# Patient Record
Sex: FEMALE | Race: WHITE | NOT HISPANIC OR LATINO | Employment: OTHER | URBAN - METROPOLITAN AREA
[De-identification: names, ages, dates, MRNs, and addresses within clinical notes are randomized per-mention and may not be internally consistent; named-entity substitution may affect disease eponyms.]

---

## 2019-03-22 ENCOUNTER — TRANSCRIBE ORDERS (OUTPATIENT)
Dept: ADMINISTRATIVE | Facility: HOSPITAL | Age: 69
End: 2019-03-22

## 2019-03-22 DIAGNOSIS — R07.9 CHEST PAIN IN ADULT: Primary | ICD-10-CM

## 2019-04-11 ENCOUNTER — HOSPITAL ENCOUNTER (OUTPATIENT)
Dept: CARDIOLOGY | Facility: HOSPITAL | Age: 69
Discharge: HOME | End: 2019-04-11
Admitting: INTERNAL MEDICINE

## 2019-04-11 VITALS
WEIGHT: 147 LBS | BODY MASS INDEX: 23.63 KG/M2 | HEIGHT: 66 IN | HEART RATE: 70 BPM | DIASTOLIC BLOOD PRESSURE: 86 MMHG | SYSTOLIC BLOOD PRESSURE: 144 MMHG

## 2019-04-11 DIAGNOSIS — R07.9 CHEST PAIN IN ADULT: ICD-10-CM

## 2019-04-11 LAB
BH CV ECHO MEAS - AO MAX PG: 8.8 MMHG
BH CV ECHO MEAS - AO ROOT AREA (BSA CORRECTED): 1.4
BH CV ECHO MEAS - AO ROOT AREA: 4.5 CM^2
BH CV ECHO MEAS - AO ROOT DIAM: 2.4 CM
BH CV ECHO MEAS - AO V2 MAX: 148 CM/SEC
BH CV ECHO MEAS - BSA(HAYCOCK): 1.8 M^2
BH CV ECHO MEAS - BSA: 1.8 M^2
BH CV ECHO MEAS - BZI_BMI: 23.7 KILOGRAMS/M^2
BH CV ECHO MEAS - BZI_METRIC_HEIGHT: 167.6 CM
BH CV ECHO MEAS - BZI_METRIC_WEIGHT: 66.7 KG
BH CV ECHO MEAS - EDV(CUBED): 74.6 ML
BH CV ECHO MEAS - EDV(MOD-SP2): 56 ML
BH CV ECHO MEAS - EDV(MOD-SP4): 60 ML
BH CV ECHO MEAS - EDV(TEICH): 79 ML
BH CV ECHO MEAS - EF(CUBED): 68 %
BH CV ECHO MEAS - EF(MOD-BP): 66 %
BH CV ECHO MEAS - EF(MOD-SP2): 66.1 %
BH CV ECHO MEAS - EF(MOD-SP4): 66.7 %
BH CV ECHO MEAS - EF(TEICH): 59.9 %
BH CV ECHO MEAS - ESV(CUBED): 23.9 ML
BH CV ECHO MEAS - ESV(MOD-SP2): 19 ML
BH CV ECHO MEAS - ESV(MOD-SP4): 20 ML
BH CV ECHO MEAS - ESV(TEICH): 31.7 ML
BH CV ECHO MEAS - FS: 31.6 %
BH CV ECHO MEAS - IVS/LVPW: 0.98
BH CV ECHO MEAS - IVSD: 0.79 CM
BH CV ECHO MEAS - LA DIMENSION: 3.1 CM
BH CV ECHO MEAS - LA/AO: 1.3
BH CV ECHO MEAS - LV DIASTOLIC VOL/BSA (35-75): 34.2 ML/M^2
BH CV ECHO MEAS - LV MASS(C)D: 101.3 GRAMS
BH CV ECHO MEAS - LV MASS(C)DI: 57.7 GRAMS/M^2
BH CV ECHO MEAS - LV SYSTOLIC VOL/BSA (12-30): 11.4 ML/M^2
BH CV ECHO MEAS - LVIDD: 4.2 CM
BH CV ECHO MEAS - LVIDS: 2.9 CM
BH CV ECHO MEAS - LVLD AP2: 6.7 CM
BH CV ECHO MEAS - LVLD AP4: 7 CM
BH CV ECHO MEAS - LVLS AP2: 5.8 CM
BH CV ECHO MEAS - LVLS AP4: 5.6 CM
BH CV ECHO MEAS - LVOT AREA (M): 3.1 CM^2
BH CV ECHO MEAS - LVOT AREA: 3.1 CM^2
BH CV ECHO MEAS - LVOT DIAM: 2 CM
BH CV ECHO MEAS - LVPWD: 0.81 CM
BH CV ECHO MEAS - SI(CUBED): 28.9 ML/M^2
BH CV ECHO MEAS - SI(MOD-SP2): 21.1 ML/M^2
BH CV ECHO MEAS - SI(MOD-SP4): 22.8 ML/M^2
BH CV ECHO MEAS - SI(TEICH): 27 ML/M^2
BH CV ECHO MEAS - SV(CUBED): 50.7 ML
BH CV ECHO MEAS - SV(MOD-SP2): 37 ML
BH CV ECHO MEAS - SV(MOD-SP4): 40 ML
BH CV ECHO MEAS - SV(TEICH): 47.4 ML
BH CV STRESS BP STAGE 1: NORMAL
BH CV STRESS BP STAGE 2: NORMAL
BH CV STRESS BP STAGE 3: NORMAL
BH CV STRESS DURATION MIN STAGE 1: 3
BH CV STRESS DURATION MIN STAGE 2: 3
BH CV STRESS DURATION MIN STAGE 3: 2
BH CV STRESS DURATION SEC STAGE 1: 0
BH CV STRESS DURATION SEC STAGE 2: 0
BH CV STRESS DURATION SEC STAGE 3: 22
BH CV STRESS ECHO POST STRESS EJECTION FRACTION EF: 75 %
BH CV STRESS GRADE STAGE 1: 10
BH CV STRESS GRADE STAGE 2: 12
BH CV STRESS GRADE STAGE 3: 14
BH CV STRESS HR STAGE 1: 96
BH CV STRESS HR STAGE 2: 130
BH CV STRESS HR STAGE 3: 153
BH CV STRESS METS STAGE 1: 5
BH CV STRESS METS STAGE 2: 7.5
BH CV STRESS METS STAGE 3: 10
BH CV STRESS O2 STAGE 1: 100
BH CV STRESS O2 STAGE 2: 95
BH CV STRESS O2 STAGE 3: 94
BH CV STRESS PROTOCOL 1: NORMAL
BH CV STRESS RECOVERY BP: NORMAL MMHG
BH CV STRESS RECOVERY HR: 68 BPM
BH CV STRESS SPEED STAGE 1: 1.7
BH CV STRESS SPEED STAGE 2: 2.5
BH CV STRESS SPEED STAGE 3: 3.4
BH CV STRESS STAGE 1: 1
BH CV STRESS STAGE 2: 2
BH CV STRESS STAGE 3: 3
BH CV VAS BP RIGHT ARM: NORMAL MMHG
LV EF 2D ECHO EST: 65 %
MAXIMAL PREDICTED HEART RATE: 152 BPM
PERCENT MAX PREDICTED HR: 101.97 %
STRESS BASELINE BP: NORMAL MMHG
STRESS BASELINE HR: 70 BPM
STRESS O2 SAT REST: 100 %
STRESS PERCENT HR: 120 %
STRESS POST ESTIMATED WORKLOAD: 10.1 METS
STRESS POST EXERCISE DUR MIN: 8 MIN
STRESS POST EXERCISE DUR SEC: 22 SEC
STRESS POST O2 SAT PEAK: 94 %
STRESS POST PEAK BP: NORMAL MMHG
STRESS POST PEAK HR: 155 BPM
STRESS TARGET HR: 129 BPM

## 2019-04-11 PROCEDURE — 93018 CV STRESS TEST I&R ONLY: CPT | Performed by: INTERNAL MEDICINE

## 2019-04-11 PROCEDURE — 93350 STRESS TTE ONLY: CPT | Performed by: INTERNAL MEDICINE

## 2019-04-11 PROCEDURE — 93017 CV STRESS TEST TRACING ONLY: CPT

## 2019-04-11 PROCEDURE — 93350 STRESS TTE ONLY: CPT

## 2019-04-11 RX ORDER — ZOLPIDEM TARTRATE 10 MG/1
TABLET ORAL AS NEEDED
COMMUNITY
Start: 2019-03-19

## 2019-04-11 RX ORDER — LEVOTHYROXINE SODIUM 88 UG/1
88 TABLET ORAL
COMMUNITY
Start: 2019-03-19

## 2021-02-16 ENCOUNTER — TRANSCRIBE ORDERS (OUTPATIENT)
Dept: ADMINISTRATIVE | Facility: HOSPITAL | Age: 71
End: 2021-02-16

## 2021-02-16 DIAGNOSIS — Z12.31 VISIT FOR SCREENING MAMMOGRAM: Primary | ICD-10-CM

## 2021-04-14 ENCOUNTER — HOSPITAL ENCOUNTER (OUTPATIENT)
Dept: MAMMOGRAPHY | Facility: HOSPITAL | Age: 71
Discharge: HOME | End: 2021-04-14
Admitting: INTERNAL MEDICINE

## 2021-04-14 ENCOUNTER — APPOINTMENT (OUTPATIENT)
Dept: OTHER | Facility: HOSPITAL | Age: 71
End: 2021-04-14

## 2021-04-14 DIAGNOSIS — Z92.89 H/O MAMMOGRAM: ICD-10-CM

## 2021-04-14 DIAGNOSIS — Z12.31 VISIT FOR SCREENING MAMMOGRAM: ICD-10-CM

## 2021-04-14 PROCEDURE — 77063 BREAST TOMOSYNTHESIS BI: CPT | Performed by: RADIOLOGY

## 2021-04-14 PROCEDURE — 77067 SCR MAMMO BI INCL CAD: CPT | Performed by: RADIOLOGY

## 2021-04-14 PROCEDURE — 77063 BREAST TOMOSYNTHESIS BI: CPT

## 2021-04-14 PROCEDURE — 77067 SCR MAMMO BI INCL CAD: CPT

## 2021-07-20 ENCOUNTER — TRANSCRIBE ORDERS (OUTPATIENT)
Dept: MAMMOGRAPHY | Facility: HOSPITAL | Age: 71
End: 2021-07-20

## 2021-07-20 DIAGNOSIS — N63.10 LUMP OF RIGHT BREAST: Primary | ICD-10-CM

## 2021-07-20 DIAGNOSIS — N63.10 UNSPECIFIED LUMP IN THE RIGHT BREAST, UNSPECIFIED QUADRANT: ICD-10-CM

## 2021-07-22 ENCOUNTER — TRANSCRIBE ORDERS (OUTPATIENT)
Dept: ADMINISTRATIVE | Facility: HOSPITAL | Age: 71
End: 2021-07-22

## 2021-07-22 DIAGNOSIS — N64.4 BREAST PAIN, RIGHT: Primary | ICD-10-CM

## 2021-08-25 ENCOUNTER — HOSPITAL ENCOUNTER (OUTPATIENT)
Dept: ULTRASOUND IMAGING | Facility: HOSPITAL | Age: 71
Discharge: HOME | End: 2021-08-25

## 2021-08-25 ENCOUNTER — HOSPITAL ENCOUNTER (OUTPATIENT)
Dept: MAMMOGRAPHY | Facility: HOSPITAL | Age: 71
Discharge: HOME | End: 2021-08-25

## 2021-08-25 DIAGNOSIS — N64.4 BREAST PAIN, RIGHT: ICD-10-CM

## 2021-08-25 PROCEDURE — 77065 DX MAMMO INCL CAD UNI: CPT

## 2021-08-25 PROCEDURE — 77065 DX MAMMO INCL CAD UNI: CPT | Performed by: RADIOLOGY

## 2021-08-25 PROCEDURE — G0279 TOMOSYNTHESIS, MAMMO: HCPCS

## 2021-08-25 PROCEDURE — G0279 TOMOSYNTHESIS, MAMMO: HCPCS | Performed by: RADIOLOGY

## 2021-08-25 PROCEDURE — 76642 ULTRASOUND BREAST LIMITED: CPT

## 2021-08-25 PROCEDURE — 76642 ULTRASOUND BREAST LIMITED: CPT | Performed by: RADIOLOGY

## 2021-11-05 ENCOUNTER — TRANSCRIBE ORDERS (OUTPATIENT)
Dept: ADMINISTRATIVE | Facility: HOSPITAL | Age: 71
End: 2021-11-05

## 2021-11-05 DIAGNOSIS — G89.29 CHRONIC NONINTRACTABLE HEADACHE, UNSPECIFIED HEADACHE TYPE: Primary | ICD-10-CM

## 2021-11-05 DIAGNOSIS — R51.9 CHRONIC NONINTRACTABLE HEADACHE, UNSPECIFIED HEADACHE TYPE: Primary | ICD-10-CM

## 2022-01-05 ENCOUNTER — OFFICE VISIT (OUTPATIENT)
Dept: NEUROSURGERY | Facility: CLINIC | Age: 72
End: 2022-01-05

## 2022-01-05 VITALS
WEIGHT: 172.8 LBS | HEIGHT: 66 IN | BODY MASS INDEX: 27.77 KG/M2 | SYSTOLIC BLOOD PRESSURE: 162 MMHG | DIASTOLIC BLOOD PRESSURE: 92 MMHG | TEMPERATURE: 97.5 F

## 2022-01-05 DIAGNOSIS — J38.02 VOCAL CORD PARALYSIS, BILATERAL PARTIAL: ICD-10-CM

## 2022-01-05 DIAGNOSIS — I10 ESSENTIAL HYPERTENSION: ICD-10-CM

## 2022-01-05 DIAGNOSIS — M54.2 NECK PAIN: Primary | ICD-10-CM

## 2022-01-05 PROBLEM — Z79.890 LONG-TERM CURRENT USE OF THYROID HORMONE REPLACEMENT THERAPY: Status: ACTIVE | Noted: 2022-01-05

## 2022-01-05 PROCEDURE — 99204 OFFICE O/P NEW MOD 45 MIN: CPT | Performed by: NEUROLOGICAL SURGERY

## 2022-01-05 RX ORDER — HYDROCHLOROTHIAZIDE 12.5 MG/1
12.5 TABLET ORAL DAILY
COMMUNITY
Start: 2021-12-31

## 2022-01-05 RX ORDER — LEVOTHYROXINE SODIUM 0.1 MG/1
TABLET ORAL
COMMUNITY
Start: 2021-12-09

## 2022-09-09 ENCOUNTER — OFFICE VISIT (OUTPATIENT)
Dept: CARDIOLOGY | Facility: CLINIC | Age: 72
End: 2022-09-09

## 2022-09-09 VITALS
WEIGHT: 170 LBS | OXYGEN SATURATION: 98 % | HEART RATE: 93 BPM | SYSTOLIC BLOOD PRESSURE: 150 MMHG | HEIGHT: 66 IN | BODY MASS INDEX: 27.32 KG/M2 | DIASTOLIC BLOOD PRESSURE: 80 MMHG

## 2022-09-09 DIAGNOSIS — R07.89 CHEST PAIN, ATYPICAL: Primary | ICD-10-CM

## 2022-09-09 PROCEDURE — 93000 ELECTROCARDIOGRAM COMPLETE: CPT | Performed by: INTERNAL MEDICINE

## 2022-09-09 PROCEDURE — 99203 OFFICE O/P NEW LOW 30 MIN: CPT | Performed by: INTERNAL MEDICINE

## 2022-09-09 RX ORDER — DIPHENOXYLATE HYDROCHLORIDE AND ATROPINE SULFATE 2.5; .025 MG/1; MG/1
TABLET ORAL DAILY
COMMUNITY

## 2022-09-09 RX ORDER — OMEPRAZOLE 40 MG/1
40 CAPSULE, DELAYED RELEASE ORAL DAILY
COMMUNITY
Start: 2022-08-26

## 2022-09-21 ENCOUNTER — HOSPITAL ENCOUNTER (OUTPATIENT)
Dept: CARDIOLOGY | Facility: HOSPITAL | Age: 72
Discharge: HOME | End: 2022-09-21
Admitting: INTERNAL MEDICINE

## 2022-09-21 DIAGNOSIS — R07.89 CHEST PAIN, ATYPICAL: ICD-10-CM

## 2022-09-21 LAB
BH CV ECHO MEAS - EDV(CUBED): 54.9 ML
BH CV ECHO MEAS - EDV(MOD-SP2): 124 ML
BH CV ECHO MEAS - EDV(MOD-SP4): 102 ML
BH CV ECHO MEAS - EF(MOD-BP): 51 %
BH CV ECHO MEAS - EF(MOD-SP2): 50.5 %
BH CV ECHO MEAS - EF(MOD-SP4): 51.7 %
BH CV ECHO MEAS - ESV(CUBED): 19.7 ML
BH CV ECHO MEAS - ESV(MOD-SP2): 61.4 ML
BH CV ECHO MEAS - ESV(MOD-SP4): 49.3 ML
BH CV ECHO MEAS - FS: 28.9 %
BH CV ECHO MEAS - IVS/LVPW: 0.92 CM
BH CV ECHO MEAS - IVSD: 1.1 CM
BH CV ECHO MEAS - LA DIMENSION: 3.4 CM
BH CV ECHO MEAS - LV DIASTOLIC VOL/BSA (35-75): 54.8 CM2
BH CV ECHO MEAS - LV MASS(C)D: 143.8 GRAMS
BH CV ECHO MEAS - LV SYSTOLIC VOL/BSA (12-30): 26.5 CM2
BH CV ECHO MEAS - LVIDD: 3.8 CM
BH CV ECHO MEAS - LVIDS: 2.7 CM
BH CV ECHO MEAS - LVPWD: 1.2 CM
BH CV ECHO MEAS - SI(MOD-SP2): 33.6 ML/M2
BH CV ECHO MEAS - SI(MOD-SP4): 28.3 ML/M2
BH CV ECHO MEAS - SV(MOD-SP2): 62.6 ML
BH CV ECHO MEAS - SV(MOD-SP4): 52.7 ML
BH CV STRESS BP STAGE 1: NORMAL
BH CV STRESS BP STAGE 2: NORMAL
BH CV STRESS DURATION MIN STAGE 1: 3
BH CV STRESS DURATION MIN STAGE 2: 3
BH CV STRESS DURATION SEC STAGE 1: 0
BH CV STRESS DURATION SEC STAGE 2: 7
BH CV STRESS ECHO POST STRESS EJECTION FRACTION EF: 60 %
BH CV STRESS GRADE STAGE 1: 10
BH CV STRESS GRADE STAGE 2: 12
BH CV STRESS HR STAGE 1: 139
BH CV STRESS HR STAGE 2: 160
BH CV STRESS METS STAGE 1: 5
BH CV STRESS METS STAGE 2: 7.5
BH CV STRESS O2 STAGE 1: 98
BH CV STRESS O2 STAGE 2: 98
BH CV STRESS PROTOCOL 1: NORMAL
BH CV STRESS RECOVERY BP: NORMAL MMHG
BH CV STRESS RECOVERY HR: 84 BPM
BH CV STRESS SPEED STAGE 1: 1.7
BH CV STRESS SPEED STAGE 2: 2.5
BH CV STRESS STAGE 1: 1
BH CV STRESS STAGE 2: 2
BH CV VAS BP LEFT ARM: NORMAL MMHG
MAXIMAL PREDICTED HEART RATE: 148 BPM
PERCENT MAX PREDICTED HR: 108.11 %
STRESS BASELINE BP: NORMAL MMHG
STRESS BASELINE HR: 73 BPM
STRESS O2 SAT REST: 99 %
STRESS PERCENT HR: 127 %
STRESS POST ESTIMATED WORKLOAD: 7 METS
STRESS POST EXERCISE DUR MIN: 6 MIN
STRESS POST EXERCISE DUR SEC: 7 SEC
STRESS POST O2 SAT PEAK: 97 %
STRESS POST PEAK BP: NORMAL MMHG
STRESS POST PEAK HR: 160 BPM
STRESS TARGET HR: 126 BPM

## 2022-09-21 PROCEDURE — 93350 STRESS TTE ONLY: CPT

## 2022-09-21 PROCEDURE — 93352 ADMIN ECG CONTRAST AGENT: CPT | Performed by: INTERNAL MEDICINE

## 2022-09-21 PROCEDURE — 93325 DOPPLER ECHO COLOR FLOW MAPG: CPT

## 2022-09-21 PROCEDURE — 25010000002 SULFUR HEXAFLUORIDE MICROSPH 60.7-25 MG RECONSTITUTED SUSPENSION: Performed by: INTERNAL MEDICINE

## 2022-09-21 PROCEDURE — 93017 CV STRESS TEST TRACING ONLY: CPT

## 2022-09-21 PROCEDURE — 93320 DOPPLER ECHO COMPLETE: CPT

## 2022-09-21 PROCEDURE — 93350 STRESS TTE ONLY: CPT | Performed by: INTERNAL MEDICINE

## 2022-09-21 PROCEDURE — 93018 CV STRESS TEST I&R ONLY: CPT | Performed by: INTERNAL MEDICINE

## 2022-09-21 RX ADMIN — SULFUR HEXAFLUORIDE 5 ML: KIT at 09:26

## 2022-09-28 ENCOUNTER — TRANSCRIBE ORDERS (OUTPATIENT)
Dept: ADMINISTRATIVE | Facility: HOSPITAL | Age: 72
End: 2022-09-28

## 2022-09-28 DIAGNOSIS — Z12.31 VISIT FOR SCREENING MAMMOGRAM: Primary | ICD-10-CM

## 2022-09-30 ENCOUNTER — TELEPHONE (OUTPATIENT)
Dept: CARDIOLOGY | Facility: CLINIC | Age: 72
End: 2022-09-30

## 2022-11-01 ENCOUNTER — APPOINTMENT (OUTPATIENT)
Dept: MAMMOGRAPHY | Facility: HOSPITAL | Age: 72
End: 2022-11-01

## 2022-11-15 ENCOUNTER — HOSPITAL ENCOUNTER (OUTPATIENT)
Dept: MAMMOGRAPHY | Facility: HOSPITAL | Age: 72
Discharge: HOME | End: 2022-11-15
Admitting: INTERNAL MEDICINE

## 2022-11-15 DIAGNOSIS — Z12.31 VISIT FOR SCREENING MAMMOGRAM: ICD-10-CM

## 2022-11-15 PROCEDURE — 77067 SCR MAMMO BI INCL CAD: CPT

## 2022-11-15 PROCEDURE — 77063 BREAST TOMOSYNTHESIS BI: CPT | Performed by: RADIOLOGY

## 2022-11-15 PROCEDURE — 77067 SCR MAMMO BI INCL CAD: CPT | Performed by: RADIOLOGY

## 2022-11-15 PROCEDURE — 77063 BREAST TOMOSYNTHESIS BI: CPT

## 2022-12-14 ENCOUNTER — OFFICE VISIT (OUTPATIENT)
Dept: CARDIOLOGY | Facility: CLINIC | Age: 72
End: 2022-12-14

## 2022-12-14 VITALS
WEIGHT: 168 LBS | BODY MASS INDEX: 27 KG/M2 | HEART RATE: 79 BPM | HEIGHT: 66 IN | SYSTOLIC BLOOD PRESSURE: 142 MMHG | OXYGEN SATURATION: 96 % | DIASTOLIC BLOOD PRESSURE: 72 MMHG

## 2022-12-14 DIAGNOSIS — R07.89 CHEST PAIN, ATYPICAL: Primary | ICD-10-CM

## 2022-12-14 PROCEDURE — 99213 OFFICE O/P EST LOW 20 MIN: CPT | Performed by: INTERNAL MEDICINE

## 2024-01-03 ENCOUNTER — TRANSCRIBE ORDERS (OUTPATIENT)
Dept: ADMINISTRATIVE | Facility: HOSPITAL | Age: 74
End: 2024-01-03
Payer: COMMERCIAL

## 2024-01-03 DIAGNOSIS — Z12.31 ENCOUNTER FOR SCREENING MAMMOGRAM FOR BREAST CANCER: Primary | ICD-10-CM

## 2024-01-08 ENCOUNTER — HOSPITAL ENCOUNTER (OUTPATIENT)
Dept: MAMMOGRAPHY | Facility: HOSPITAL | Age: 74
Discharge: HOME | End: 2024-01-08
Admitting: INTERNAL MEDICINE
Payer: MEDICARE

## 2024-01-08 DIAGNOSIS — Z12.31 ENCOUNTER FOR SCREENING MAMMOGRAM FOR BREAST CANCER: ICD-10-CM

## 2024-01-08 PROCEDURE — 77063 BREAST TOMOSYNTHESIS BI: CPT

## 2024-01-08 PROCEDURE — 77067 SCR MAMMO BI INCL CAD: CPT

## 2024-01-09 PROCEDURE — 77063 BREAST TOMOSYNTHESIS BI: CPT | Performed by: RADIOLOGY

## 2024-01-09 PROCEDURE — 77067 SCR MAMMO BI INCL CAD: CPT | Performed by: RADIOLOGY

## 2024-05-16 RX ORDER — SODIUM, POTASSIUM,MAG SULFATES 17.5-3.13G
SOLUTION, RECONSTITUTED, ORAL ORAL
Qty: 354 ML | Refills: 0 | Status: SHIPPED | OUTPATIENT
Start: 2024-05-16

## 2024-05-30 ENCOUNTER — OUTSIDE FACILITY SERVICE (OUTPATIENT)
Dept: GASTROENTEROLOGY | Facility: CLINIC | Age: 74
End: 2024-05-30
Payer: MEDICARE

## 2024-05-30 PROCEDURE — G0121 COLON CA SCRN NOT HI RSK IND: HCPCS | Performed by: INTERNAL MEDICINE

## 2024-10-14 ENCOUNTER — TRANSCRIBE ORDERS (OUTPATIENT)
Dept: GENERAL RADIOLOGY | Facility: HOSPITAL | Age: 74
End: 2024-10-14
Payer: MEDICARE

## 2024-10-14 ENCOUNTER — HOSPITAL ENCOUNTER (OUTPATIENT)
Dept: GENERAL RADIOLOGY | Facility: HOSPITAL | Age: 74
Discharge: HOME | End: 2024-10-14
Admitting: INTERNAL MEDICINE
Payer: MEDICARE

## 2024-10-14 DIAGNOSIS — R07.9 CHEST PAIN IN ADULT: ICD-10-CM

## 2024-10-14 DIAGNOSIS — R07.9 CHEST PAIN IN ADULT: Primary | ICD-10-CM

## 2024-10-14 PROCEDURE — 71046 X-RAY EXAM CHEST 2 VIEWS: CPT

## 2025-03-06 ENCOUNTER — TRANSCRIBE ORDERS (OUTPATIENT)
Dept: GENERAL RADIOLOGY | Facility: HOSPITAL | Age: 75
End: 2025-03-06
Payer: MEDICARE

## 2025-03-06 ENCOUNTER — HOSPITAL ENCOUNTER (OUTPATIENT)
Dept: GENERAL RADIOLOGY | Facility: HOSPITAL | Age: 75
Discharge: HOME | End: 2025-03-06
Admitting: PHYSICIAN ASSISTANT
Payer: MEDICARE

## 2025-03-06 DIAGNOSIS — S29.9XXA TRAUMATIC INJURY OF RIB: Primary | ICD-10-CM

## 2025-03-06 DIAGNOSIS — S29.9XXA TRAUMATIC INJURY OF RIB: ICD-10-CM

## 2025-03-06 PROCEDURE — 71101 X-RAY EXAM UNILAT RIBS/CHEST: CPT

## 2025-04-02 ENCOUNTER — TRANSCRIBE ORDERS (OUTPATIENT)
Dept: ADMINISTRATIVE | Facility: HOSPITAL | Age: 75
End: 2025-04-02
Payer: MEDICARE

## 2025-04-02 ENCOUNTER — HOSPITAL ENCOUNTER (OUTPATIENT)
Facility: HOSPITAL | Age: 75
Discharge: HOME | End: 2025-04-02
Admitting: FAMILY MEDICINE
Payer: MEDICARE

## 2025-04-02 DIAGNOSIS — T14.8XXA FRACTURE IN ACCIDENTAL FALL: ICD-10-CM

## 2025-04-02 DIAGNOSIS — Z12.31 VISIT FOR SCREENING MAMMOGRAM: Primary | ICD-10-CM
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
## 2025-04-02 DIAGNOSIS — T14.8XXA FRACTURE IN ACCIDENTAL FALL: Primary | ICD-10-CM

## 2025-04-02 PROCEDURE — 70450 CT HEAD/BRAIN W/O DYE: CPT

## 2025-04-10 ENCOUNTER — HOSPITAL ENCOUNTER (OUTPATIENT)
Dept: MAMMOGRAPHY | Facility: HOSPITAL | Age: 75
Discharge: HOME | End: 2025-04-10
Admitting: INTERNAL MEDICINE
Payer: MEDICARE

## 2025-04-10 DIAGNOSIS — Z12.31 VISIT FOR SCREENING MAMMOGRAM: ICD-10-CM

## 2025-04-10 PROCEDURE — 77067 SCR MAMMO BI INCL CAD: CPT

## 2025-04-10 PROCEDURE — 77063 BREAST TOMOSYNTHESIS BI: CPT

## 2025-06-10 ENCOUNTER — TRANSCRIBE ORDERS (OUTPATIENT)
Dept: ADMINISTRATIVE | Facility: HOSPITAL | Age: 75
End: 2025-06-10
Payer: MEDICARE

## 2025-06-10 ENCOUNTER — HOSPITAL ENCOUNTER (OUTPATIENT)
Dept: GENERAL RADIOLOGY | Facility: HOSPITAL | Age: 75
Discharge: HOME | End: 2025-06-10
Admitting: INTERNAL MEDICINE
Payer: MEDICARE

## 2025-06-10 DIAGNOSIS — M54.16 ACUTE LUMBAR RADICULOPATHY: Primary | ICD-10-CM

## 2025-06-10 DIAGNOSIS — M54.16 ACUTE LUMBAR RADICULOPATHY: ICD-10-CM

## 2025-06-10 PROCEDURE — 72110 X-RAY EXAM L-2 SPINE 4/>VWS: CPT

## 2025-06-10 PROCEDURE — 73502 X-RAY EXAM HIP UNI 2-3 VIEWS: CPT

## 2025-06-12 ENCOUNTER — TRANSCRIBE ORDERS (OUTPATIENT)
Dept: ADMINISTRATIVE | Facility: HOSPITAL | Age: 75
End: 2025-06-12
Payer: MEDICARE

## 2025-06-12 DIAGNOSIS — M54.16 LUMBAR RADICULOPATHY, ACUTE: Primary | ICD-10-CM

## 2025-07-14 ENCOUNTER — TELEPHONE (OUTPATIENT)
Dept: NEUROSURGERY | Facility: CLINIC | Age: 75
End: 2025-07-14

## 2025-07-14 ENCOUNTER — OFFICE VISIT (OUTPATIENT)
Dept: NEUROSURGERY | Facility: CLINIC | Age: 75
End: 2025-07-14
Payer: MEDICARE

## 2025-07-14 VITALS — WEIGHT: 163.2 LBS | HEIGHT: 66 IN | TEMPERATURE: 97.1 F | BODY MASS INDEX: 26.23 KG/M2

## 2025-07-14 DIAGNOSIS — M51.16 RADICULOPATHY DUE TO DISORDER OF INTERVERTEBRAL DISC OF LUMBAR SPINE: Primary | ICD-10-CM

## 2025-07-14 PROCEDURE — 99204 OFFICE O/P NEW MOD 45 MIN: CPT | Performed by: PHYSICIAN ASSISTANT

## 2025-07-14 PROCEDURE — 1159F MED LIST DOCD IN RCRD: CPT | Performed by: PHYSICIAN ASSISTANT

## 2025-07-14 PROCEDURE — 1160F RVW MEDS BY RX/DR IN RCRD: CPT | Performed by: PHYSICIAN ASSISTANT

## 2025-07-14 RX ORDER — ACETAMINOPHEN 500 MG
TABLET ORAL
COMMUNITY
Start: 2025-04-02

## 2025-07-14 RX ORDER — ANTIOX #8/OM3/DHA/EPA/LUT/ZEAX 250-2.5 MG
1 CAPSULE ORAL
COMMUNITY

## 2025-07-14 RX ORDER — AMLODIPINE BESYLATE 5 MG/1
5 TABLET ORAL DAILY
COMMUNITY

## 2025-07-14 RX ORDER — PREGABALIN 25 MG/1
CAPSULE ORAL
Qty: 156 CAPSULE | Refills: 0 | Status: SHIPPED | OUTPATIENT
Start: 2025-07-14 | End: 2025-08-13

## 2025-07-14 NOTE — PROGRESS NOTES
[facility]  OFFICE VISIT NOTE        Name: Chery Prescott    : 1950     MRN: [id number]     [place] Care Provider: Cindy Wall [place] [facility] [place]    Subjective     Chief Complaint: Compression fracture L3     History of Present Illness: Chery Prescott is a very pleasant 74 y.o. female who is presenting today along with her  with concern for low back and left hip pain and recent finding of a L3 compression fracture on imaging.  She has brought the disc for her lumbar MRI which was performed at [facility] for review.  She does note that over the past couple of months she has had low back and left hip pain which are worse when she is active and improves when she is sitting or resting.  Previously she was walking 15-18,000 steps a day and currently can only tolerate about 6000 before the pain becomes too severe.  She also notes that she has been in physical therapy previously with benefit, but that she was encouraged to walk and exercise in her pool at home but this made pain worse.  Heat has been helpful but ibuprofen, muscle relaxers, steroids have not.  She thinks she has tried gabapentin but I do not see this on her medical record, and she states that she does not like taking medications due to side effects.  She denies cauda equina as well as bowel/bladder dysfunction.          The following portions of the patient's history were reviewed and updated as appropriate.     Allergies:   Allergies   Allergen Reactions    Poison Ivy Extract Itching     Medications:   Current Outpatient Medications:     acetaminophen (TYLENOL) 500 MG tablet, Take  by mouth., Disp: , Rfl:     amLODIPine (NORVASC) 5 MG tablet, Take 1 tablet by mouth Daily., Disp: , Rfl:     hydroCHLOROthiazide (HYDRODIURIL) 12.5 MG tablet, Take 1 tablet by mouth Daily., Disp: , Rfl:     levothyroxine (SYNTHROID, LEVOTHROID) 100 MCG tablet, TAKE 1 TABLET  BY MOUTH ONCE DAILY MONDAY THROUGH FRIDAY (WEEKDAYS ONLY). TAKE 88MCG TABLETS ON SATURDAY AND ., Disp: , Rfl:     levothyroxine (SYNTHROID, LEVOTHROID) 88 MCG tablet, 1 tablet. Takes on Sat and Sun, Disp: , Rfl:     multivitamin (THERAGRAN) tablet tablet, Take  by mouth Daily. Centrum Silver, Disp: , Rfl:     multivitamins-minerals (PRESERVISION AREDS 2) capsule capsule, Take 1 capsule by mouth., Disp: , Rfl:     omeprazole (priLOSEC) 40 MG capsule, 1 capsule Daily., Disp: , Rfl:     pregabalin (LYRICA) 25 MG capsule, Take 1 capsule by mouth Every Night for 2 days, THEN 1 capsule 2 (Two) Times a Day for 2 days, THEN 2 capsules 2 (Two) Times a Day for 3 days, THEN 3 capsules 2 (Two) Times a Day for 23 days., Disp: 156 capsule, Rfl: 0  Past Medical History:   Past Medical History:   Diagnosis Date    Breast injury     BREAST BRUISED S/P FALL - ? LATERALITY    Hearing loss     History of stress test     Hyperlipidemia     Hypertension     Menopause     Paralyzed vocal cords     Skin cancer     Thyroid disorder     Thyroid disorder      Past Surgical History:   Past Surgical History:   Procedure Laterality Date    BREAST BIOPSY      ? LATERALITY     SECTION      LEG SURGERY Right     THROAT SURGERY       Social Hx:   Social History     Tobacco Use    Smoking status: Never    Smokeless tobacco: Never   Vaping Use    Vaping status: Never Used   Substance Use Topics    Alcohol use: Not Currently     Comment: ocass    Drug use: Never     Family Hx:   Family History   Problem Relation Age of Onset    Liver disease Mother     Cancer Mother     Heart attack Father     Heart disease Father     Breast cancer Paternal Grandmother         DX AGE UNKNOWN     Cancer Son     Ovarian cancer Neg Hx        Review of Systems:        Review of Systems   Constitutional:  Negative for activity change, appetite change, chills, diaphoresis, fatigue, fever and unexpected weight change.   HENT:  Negative for congestion,  dental problem, drooling, ear discharge, ear pain, facial swelling, hearing loss, mouth sores, nosebleeds, postnasal drip, rhinorrhea, sinus pressure, sinus pain, sneezing, sore throat, tinnitus, trouble swallowing and voice change.    Eyes:  Negative for photophobia, pain, discharge, redness, itching and visual disturbance.   Respiratory:  Negative for apnea, cough, choking, chest tightness, shortness of breath, wheezing and stridor.    Cardiovascular:  Negative for chest pain, palpitations and leg swelling.   Gastrointestinal:  Negative for abdominal distention, abdominal pain, anal bleeding, blood in stool, constipation, diarrhea, nausea, rectal pain and vomiting.   Endocrine: Negative for cold intolerance, heat intolerance, polydipsia, polyphagia and polyuria.   Genitourinary:  Negative for decreased urine volume, difficulty urinating, dyspareunia, dysuria, enuresis, flank pain, frequency, genital sores, hematuria, menstrual problem, pelvic pain, urgency, vaginal bleeding, vaginal discharge and vaginal pain.   Musculoskeletal:  Positive for arthralgias and back pain. Negative for gait problem, joint swelling, myalgias, neck pain and neck stiffness.   Skin:  Negative for color change, pallor, rash and wound.   Allergic/Immunologic: Negative for environmental allergies, food allergies and immunocompromised state.   Neurological:  Negative for dizziness, tremors, seizures, syncope, facial asymmetry, speech difficulty, weakness, light-headedness, numbness and headaches.   Hematological:  Negative for adenopathy. Does not bruise/bleed easily.   Psychiatric/Behavioral:  Positive for sleep disturbance. Negative for agitation, behavioral problems, confusion, decreased concentration, dysphoric mood, hallucinations, self-injury and suicidal ideas. The patient is not nervous/anxious and is not hyperactive.       The remaining review of systems is negative as otherwise stated in the HPI.    Objective     Vital Signs: Temp  "97.1 °F (36.2 °C) (Infrared)   Ht 167.6 cm (66\")   Wt 74 kg (163 lb 3.2 oz)   BMI 26.34 kg/m²      Surgical Risk Factors:        BMI: Body mass index is 26.34 kg/m².        Smoking status:   Tobacco Use: Low Risk  (7/14/2025)    Patient History     Smoking Tobacco Use: Never     Smokeless Tobacco Use: Never     Passive Exposure: Not on file       Physical Exam:  Physical Exam  Constitutional:       Appearance: She is normal weight.   HENT:      Head: Normocephalic.   Eyes:      Pupils: Pupils are equal, round, and reactive to light.   Pulmonary:      Effort: Pulmonary effort is normal.   Musculoskeletal:        Legs:       Comments: SI joints and greater trochanters nontender bilaterally.  She does have some lower lumbar tenderness but this is mild, however it does provoke her pain in lumbar area through green annotated areas.   Skin:     General: Skin is warm and dry.   Neurological:      Mental Status: She is alert.          Neurological Exam  Mental Status  Alert.    Cranial Nerves  CN III, IV, VI: Pupils equal round and reactive to light bilaterally.       STEADI Fall Risk Assessment was completed, and patient is at HIGH risk for falls. Assessment completed on:7/14/2025    Independent Review of Diagnostic Studies:    Available for my review today is a lumbar MRI dated 6/12/2025 which demonstrates a chronic mild superior endplate compression fracture of L3, there is otherwise good vertebral height and adequate disc spacing throughout.  There is diffuse facet arthropathy worst at the lower 3 levels.  At L3-4 there is a narrowing of the right neuroforaminal canal and at L4-5 there is narrowing of the left neuroforaminal canal secondary to mild disc bulges, facet arthropathy, and a little ligamentum flavum hypertrophy as well.    Assessment / Plan      Diagnoses and all orders for this visit:    1. Radiculopathy due to disorder of intervertebral disc of lumbar spine (Primary)  -     Ambulatory Referral to Pain " Management  -     pregabalin (LYRICA) 25 MG capsule; Take 1 capsule by mouth Every Night for 2 days, THEN 1 capsule 2 (Two) Times a Day for 2 days, THEN 2 capsules 2 (Two) Times a Day for 3 days, THEN 3 capsules 2 (Two) Times a Day for 23 days.  Dispense: 156 capsule; Refill: 0  -     Ambulatory Referral to Physical Therapy for Evaluation & Treatment          Radiculopathy due to disorder of intervertebral disc of lumbar spine    Reassured patient that the compression fracture is very mild as well as most likely chronic in nature.  She is nontender to pressure or percussion at this level.    However, she does seem symptomatic secondary to the left neuroforaminal canal stenosis at L4-5.  She does not want surgery, and is not eager to try interventional pain management injections but willing to do so if needed.  I think she may benefit from a transforaminal epidural steroid on the left at L4-5 . I think it is reasonable to go ahead and start her with a very low-dose and titration of Lyrica given her previous sensitivity to medication side effects and increase as tolerated to a therapeutic dose.  I encouraged her to increase her walking distance gradually and to return to physical therapy as well.    I believe the risk of morbidity without intervention at this time is very low. I carefully reviewed the signs/symptoms associated with worsening cervical/thoracic or lumbosacral nerve root compression that would require further urgent/emergent workup, specifically those associated with cervical/thoracic myelopathy and cauda equina. Patient encouraged to contact us if she has any new or worsening symptoms or any concerns.    Return in about 3 months (around 10/14/2025) for Recheck.        Any copied data from previous notes included in the (1) HPI, (2) PE, (3) MDM and/or Assessment and Plan has been reviewed and accurate as of 07/14/25.    Mague Baker PA-C July 14, 2025 13:58 EDT

## 2025-08-26 DIAGNOSIS — M51.16 RADICULOPATHY DUE TO DISORDER OF INTERVERTEBRAL DISC OF LUMBAR SPINE: ICD-10-CM

## 2025-08-26 RX ORDER — PREGABALIN 25 MG/1
CAPSULE ORAL
Qty: 12 CAPSULE | Refills: 0 | Status: SHIPPED | OUTPATIENT
Start: 2025-08-26 | End: 2025-09-01